# Patient Record
Sex: FEMALE | Race: WHITE
[De-identification: names, ages, dates, MRNs, and addresses within clinical notes are randomized per-mention and may not be internally consistent; named-entity substitution may affect disease eponyms.]

---

## 2019-06-27 ENCOUNTER — HOSPITAL ENCOUNTER (EMERGENCY)
Dept: HOSPITAL 10 - FTE | Age: 19
Discharge: HOME | End: 2019-06-27
Payer: SELF-PAY

## 2019-06-27 ENCOUNTER — HOSPITAL ENCOUNTER (EMERGENCY)
Dept: HOSPITAL 91 - FTE | Age: 19
Discharge: HOME | End: 2019-06-27
Payer: SELF-PAY

## 2019-06-27 VITALS
WEIGHT: 202.83 LBS | BODY MASS INDEX: 32.6 KG/M2 | WEIGHT: 202.83 LBS | HEIGHT: 66 IN | BODY MASS INDEX: 32.6 KG/M2 | HEIGHT: 66 IN

## 2019-06-27 VITALS — RESPIRATION RATE: 20 BRPM | HEART RATE: 90 BPM | DIASTOLIC BLOOD PRESSURE: 76 MMHG | SYSTOLIC BLOOD PRESSURE: 123 MMHG
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
## 2019-06-27 DIAGNOSIS — Z3A.12: ICD-10-CM
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
## 2019-06-27 DIAGNOSIS — O9A.211: Primary | ICD-10-CM

## 2019-06-27 DIAGNOSIS — S82.892A: ICD-10-CM

## 2019-06-27 LAB
ADD MAN DIFF?: NO
ALANINE AMINOTRANSFERASE: 29 IU/L (ref 13–69)
ALBUMIN/GLOBULIN RATIO: 1.28
ALBUMIN: 4.5 G/DL (ref 3.3–4.9)
ALKALINE PHOSPHATASE: 71 IU/L (ref 42–121)
ANION GAP: 10 (ref 5–13)
ASPARTATE AMINO TRANSFERASE: 21 IU/L (ref 15–46)
BASOPHIL #: 0 10^3/UL (ref 0–0.1)
BASOPHILS %: 0.2 % (ref 0–2)
BILIRUBIN,DIRECT: 0 MG/DL (ref 0–0.2)
BILIRUBIN,TOTAL: 0.5 MG/DL (ref 0.2–1.3)
BLOOD UREA NITROGEN: 4 MG/DL (ref 7–20)
CALCIUM: 9.6 MG/DL (ref 8.4–10.2)
CARBON DIOXIDE: 24 MMOL/L (ref 21–31)
CHLORIDE: 105 MMOL/L (ref 97–110)
CREATININE: 0.54 MG/DL (ref 0.44–1)
EOSINOPHILS #: 0.1 10^3/UL (ref 0–0.5)
EOSINOPHILS %: 1.2 % (ref 0–7)
GLOBULIN: 3.5 G/DL (ref 1.3–3.2)
GLUCOSE: 101 MG/DL (ref 70–220)
HEMATOCRIT: 41.6 % (ref 37–47)
HEMOGLOBIN: 14.6 G/DL (ref 12–16)
IMMATURE GRANS #M: 0.04 10^3/UL (ref 0–0.03)
IMMATURE GRANS % (M): 0.5 % (ref 0–0.43)
LYMPHOCYTES #: 1.6 10^3/UL (ref 0.8–2.9)
LYMPHOCYTES %: 19.9 % (ref 18–55)
MEAN CORPUSCULAR HEMOGLOBIN: 28.4 PG (ref 29–33)
MEAN CORPUSCULAR HGB CONC: 35.1 G/DL (ref 32–37)
MEAN CORPUSCULAR VOLUME: 80.9 FL (ref 72–104)
MEAN PLATELET VOLUME: 10.3 FL (ref 7.4–10.4)
MONOCYTE #: 0.5 10^3/UL (ref 0.3–0.9)
MONOCYTES %: 6.1 % (ref 0–13)
NEUTROPHIL #: 5.8 10^3/UL (ref 1.6–7.5)
NEUTROPHILS %: 72.1 % (ref 30–74)
NUCLEATED RED BLOOD CELLS #: 0 10^3/UL (ref 0–0)
NUCLEATED RED BLOOD CELLS%: 0 /100WBC (ref 0–0)
PLATELET COUNT: 288 10^3/UL (ref 140–415)
POTASSIUM: 4.1 MMOL/L (ref 3.5–5.1)
RED BLOOD COUNT: 5.14 10^6/UL (ref 4.2–5.4)
RED CELL DISTRIBUTION WIDTH: 12.1 % (ref 11.5–14.5)
SODIUM: 139 MMOL/L (ref 135–144)
TOTAL PROTEIN: 8 G/DL (ref 6.1–8.1)
WHITE BLOOD COUNT: 8.1 10^3/UL (ref 4.8–10.8)

## 2019-06-27 PROCEDURE — 85025 COMPLETE CBC W/AUTO DIFF WBC: CPT

## 2019-06-27 PROCEDURE — 73610 X-RAY EXAM OF ANKLE: CPT

## 2019-06-27 PROCEDURE — 36415 COLL VENOUS BLD VENIPUNCTURE: CPT

## 2019-06-27 PROCEDURE — 80053 COMPREHEN METABOLIC PANEL: CPT

## 2019-06-27 PROCEDURE — 99284 EMERGENCY DEPT VISIT MOD MDM: CPT

## 2019-06-27 RX ADMIN — ACETAMINOPHEN 1 MG: 325 TABLET, FILM COATED ORAL at 10:41

## 2019-06-27 NOTE — ERD
ER Documentation


Chief Complaint


Chief Complaint





left foot pain/injury





HPI


Patient is a 18 years old  female at 12 weeks gestation presenting to the 


clinic for severe left ankle pain and swelling since yesterday, sporadically. Vinay mccall admits to left ankle injury in December while visiting [place]. Patient 


denies getting evaluated stating that the pain resolved 2 months later. Patient 


states that her pain and selling came without any activity while she was sitting


with ankle extended. Patient denies taking any OTC medication. Patient admits to


low protein diet stating that she avoids meat as much as possible.





ROS


All systems reviewed and are negative except as per history of present illness.





Medications


Home Meds


Active Scripts


Acetaminophen* (Tylophen*) 500 Mg Capsule, 1 CAP PO Q6H PRN for PAIN AND OR 


ELEVATED TEMP, #20 CAP


   Prov:CARLO RIBERA PA-C         19





Allergies


Allergies:  


Coded Allergies:  


     No Known Allergy (Unverified , 19)





PMhx/Soc


History of Surgery:  No


Anesthesia Reaction:  No


Hx Neurological Disorder:  No


Hx Respiratory Disorders:  No


Hx Cardiac Disorders:  No


Hx Psychiatric Problems:  No


Hx Miscellaneous Medical Probl:  No





FmHx


Family History:  No diabetes, No coronary disease, No other





Physical Exam


Vitals





Vital Signs


  Date      Temp  Pulse  Resp  B/P (MAP)   Pulse Ox  O2          O2 Flow    FiO2


Time                                                 Delivery    Rate


   19  99.1    111    20      137/87        96


     10:23                          (104)





Physical Exam


Const:   No acute distress. Patient sitting on wheelchair comfortably with ACE 


wrap around left ankle.


Head:   Atraumatic 


Eyes:    Normal Conjunctiva


Resp:   Clear to auscultation bilaterally


Cardio:   Regular rate and rhythm, no murmurs


Skin:   No petechiae or rashes


Back:   No midline or flank tenderness


Left Ankle Exam: Swelling on lateral side that is tender to palpation. Skin 


intact. No signs of gross trauma.


Neur:   Awake and alert


Psych:    Normal Mood and Affect


Result Diagram:  


19 1049                                                                    


           19 1049





Results 24 hrs





Laboratory Tests


              Test
                                 19
10:49


              White Blood Count                      8.1 10^3/ul


              Red Blood Count                       5.14 10^6/ul


              Hemoglobin                               14.6 g/dl


              Hematocrit                                  41.6 %


              Mean Corpuscular Volume                    80.9 fl


              Mean Corpuscular Hemoglobin                28.4 pg


              Mean Corpuscular Hemoglobin
Concent     35.1 g/dl 



              Red Cell Distribution Width                 12.1 %


              Platelet Count                         288 10^3/UL


              Mean Platelet Volume                       10.3 fl


              Immature Granulocytes %                    0.500 %


              Neutrophils %                               72.1 %


              Lymphocytes %                               19.9 %


              Monocytes %                                  6.1 %


              Eosinophils %                                1.2 %


              Basophils %                                  0.2 %


              Nucleated Red Blood Cells %            0.0 /100WBC


              Immature Granulocytes #              0.040 10^3/ul


              Neutrophils #                          5.8 10^3/ul


              Lymphocytes #                          1.6 10^3/ul


              Monocytes #                            0.5 10^3/ul


              Eosinophils #                          0.1 10^3/ul


              Basophils #                            0.0 10^3/ul


              Nucleated Red Blood Cells #            0.0 10^3/ul


              Sodium Level                            139 mmol/L


              Potassium Level                         4.1 mmol/L


              Chloride Level                          105 mmol/L


              Carbon Dioxide Level                     24 mmol/L


              Anion Gap                                       10


              Blood Urea Nitrogen                        4 mg/dl


              Creatinine                              0.54 mg/dl


              Est Glomerular Filtrat Rate
mL/min   > 60 mL/min 



              Glucose Level                            101 mg/dl


              Calcium Level                            9.6 mg/dl


              Total Bilirubin                          0.5 mg/dl


              Direct Bilirubin                        0.00 mg/dl


              Indirect Bilirubin                       0.5 mg/dl


              Aspartate Amino Transf
(AST/SGOT)         21 IU/L 



              Alanine Aminotransferase
(ALT/SGPT)       29 IU/L 



              Alkaline Phosphatase                       71 IU/L


              Total Protein                             8.0 g/dl


              Albumin                                   4.5 g/dl


              Globulin                                 3.50 g/dl


              Albumin/Globulin Ratio                        1.28





Current Medications


 Medications
   Dose
          Sig/Rafita
       Start Time
   Status  Last


 (Trade)       Ordered        Route
 PRN     Stop Time              Admin
Dose


                              Reason                                Admin


                650 mg         ONCE  ONCE
    19       DC           19


Acetaminophen                 PO
            11:00
                       10:41




  (Tylenol                                  19 11:01


Tab)








Procedures/MDM


Patient was seen and evaluated for left ankle pain/swelling. CBC, CMP are 


unremarkable. Left ankle X-Ray revealed Tiny ossific body adjacent to the 


anterior tibial plafond, seen only on the lateral view. This may be chronic or 


represent a tiny avulsion fracture. Correlate for acute injury and tenderness to


that area. Small lucency in the lateral talar dome may be sequelae of 


osteochondral injury. Tylenol was administered in ED. 





Patient is stable and ready for discharge. Patient was advised to f/u with PCP a


nd Orthopedic for further evaluation. Patient was given left ankle/foot boot. 


Patient denied crutches. Leg elevation and Ice.





Departure


Diagnosis:  


   Primary Impression:  


   Ankle injury


   Encounter type:  initial encounter  Laterality:  left  Qualified Codes:  


   S99.912A - Unspecified injury of left ankle, initial encounter


   Additional Impression:  


   Ankle fracture


   Encounter type:  initial encounter  Fracture type:  closed  Laterality:  left


    Qualified Codes:  S82.892A - Other fracture of left lower leg, initial 


   encounter for closed fracture


Patient Instructions:  Fracture, Ankle (General)


Referrals:  


[facility]





Additional Instructions:  


Patient advised to return to the ED immediately for new or worsening symptoms. 


Patient advised to follow up with primary care provider in the next 24-48 hours.


Patient verbalized understanding and agrees with treatment plan and course of 


action.














If patient has no primary care they may follow up with














[facility]





[address] [place] [id number]














or














[facility]





[address] [place] [address]














or














[facility]





[address] [place] [address]











CARLO RIBERA PA-C               2019 10:44